# Patient Record
Sex: MALE | Race: WHITE | NOT HISPANIC OR LATINO | Employment: PART TIME | URBAN - NONMETROPOLITAN AREA
[De-identification: names, ages, dates, MRNs, and addresses within clinical notes are randomized per-mention and may not be internally consistent; named-entity substitution may affect disease eponyms.]

---

## 2017-01-04 PROBLEM — R79.89 LOW VITAMIN D LEVEL: Status: ACTIVE | Noted: 2017-01-04

## 2017-06-06 ENCOUNTER — TELEPHONE (OUTPATIENT)
Dept: INTERNAL MEDICINE | Facility: CLINIC | Age: 72
End: 2017-06-06

## 2017-06-06 NOTE — TELEPHONE ENCOUNTER
Patients wife called the office requesting to see if you could give her a call back when you get a chance in regards to [facility] exams. She is wanting to know when Dr Zhou thinks he is due for his next exam.

## 2017-08-02 ENCOUNTER — OFFICE VISIT (OUTPATIENT)
Dept: INTERNAL MEDICINE | Facility: CLINIC | Age: 72
End: 2017-08-02

## 2017-08-02 VITALS
SYSTOLIC BLOOD PRESSURE: 140 MMHG | OXYGEN SATURATION: 98 % | WEIGHT: 175 LBS | HEART RATE: 78 BPM | DIASTOLIC BLOOD PRESSURE: 80 MMHG | RESPIRATION RATE: 14 BRPM | HEIGHT: 70 IN | BODY MASS INDEX: 25.05 KG/M2 | TEMPERATURE: 98.1 F

## 2017-08-02 DIAGNOSIS — I10 ESSENTIAL HYPERTENSION: ICD-10-CM

## 2017-08-02 DIAGNOSIS — R35.1 NOCTURIA: ICD-10-CM

## 2017-08-02 DIAGNOSIS — E78.5 HYPERLIPIDEMIA, UNSPECIFIED HYPERLIPIDEMIA TYPE: Primary | ICD-10-CM

## 2017-08-02 DIAGNOSIS — R79.89 LOW VITAMIN D LEVEL: ICD-10-CM

## 2017-08-02 DIAGNOSIS — R73.9 HYPERGLYCEMIA: ICD-10-CM

## 2017-08-02 LAB
HBA1C MFR BLD: 5.7 %
PSA SERPL-MCNC: 2.85 NG/ML (ref 0.06–4)

## 2017-08-02 PROCEDURE — 96160 PT-FOCUSED HLTH RISK ASSMT: CPT | Performed by: INTERNAL MEDICINE

## 2017-08-02 PROCEDURE — 93000 ELECTROCARDIOGRAM COMPLETE: CPT | Performed by: INTERNAL MEDICINE

## 2017-08-02 PROCEDURE — G0439 PPPS, SUBSEQ VISIT: HCPCS | Performed by: INTERNAL MEDICINE

## 2017-08-02 NOTE — PROGRESS NOTES
QUICK REFERENCE INFORMATION:  The ABCs of the Annual Wellness Visit    Subsequent Medicare Wellness Visit    HEALTH RISK ASSESSMENT    1945    Recent Hospitalizations:  No hospitalization(s) within the last year..        Current Medical Providers:  Patient Care Team:  Getachew Zhou MD as PCP - General        Smoking Status:  History   Smoking Status   • Never Smoker   Smokeless Tobacco   • Never Used       Alcohol Consumption:  History   Alcohol Use No       Depression Screen:   PHQ-9 Depression Screening 8/2/2017   Little interest or pleasure in doing things 0   Feeling down, depressed, or hopeless 0   PHQ-9 Total Score 0       Health Habits and Functional and Cognitive Screening:  Functional & Cognitive Status 8/2/2017   Do you have difficulty preparing food and eating? No   Do you have difficulty bathing yourself? No   Do you have difficulty getting dressed? No   Do you have difficulty using the toilet? No   Do you have difficulty moving around from place to place? No   In the past year have you fallen or experienced a near fall? No   Do you need help using the phone?  No   Are you deaf or do you have serious difficulty hearing?  No   Do you need help with transportation? No   Do you need help shopping? No   Do you need help preparing meals?  No   Do you need help with housework?  No   Do you need help with laundry? No   Do you need help taking your medications? No   Do you need help managing money? No   Do you have difficulty concentrating, remembering or making decisions? No       Health Habits  Current Diet: Well Balanced Diet  Dental Exam: Up to date  Eye Exam: Up to date  Exercise (times per week): 5 times per week  Current Exercise Activities Include: Walking      Does the patient have evidence of cognitive impairment? No    Aspirin use counseling: Taking ASA appropriately as indicated      Recent Lab Results:  CMP:  Lab Results   Component Value Date    BUN 20 03/04/2016    CREATININE 0.8 03/04/2016      03/04/2016    K 5.0 03/04/2016    CO2 32 (H) 03/04/2016    CALCIUM 8.2 (L) 03/04/2016    ALBUMIN 3.6 03/04/2016    BILITOT 0.7 03/04/2016    ALKPHOS 73 03/04/2016    AST 35 (H) 03/04/2016    ALT 27 03/04/2016     Lipid Panel:  Lab Results   Component Value Date    TRIG 81 03/04/2016    HDL 54 03/04/2016    LDLDIRECT 141 (H) 03/04/2016     HbA1c:       Visual Acuity:  No exam data present    Age-appropriate Screening Schedule:  Refer to the list below for future screening recommendations based on patient's age, sex and/or medical conditions. Orders for these recommended tests are listed in the plan section. The patient has been provided with a written plan.    Health Maintenance   Topic Date Due   • TDAP/TD VACCINES (1 - Tdap) 04/05/1964   • PNEUMOCOCCAL VACCINES (65+ LOW/MEDIUM RISK) (1 of 2 - PCV13) 04/05/2010   • COLONOSCOPY  12/06/2016   • ZOSTER VACCINE  12/06/2016   • LIPID PANEL  03/04/2017   • INFLUENZA VACCINE  09/01/2017        Subjective   History of Present Illness    Jonathan Esqueda Jr. is a 72 y.o. male who presents for an Subsequent Wellness Visit.    The following portions of the patient's history were reviewed and updated as appropriate: allergies, current medications, past family history, past medical history, past social history, past surgical history and problem list.    Outpatient Medications Prior to Visit   Medication Sig Dispense Refill   • aspirin 81 MG tablet Take  by mouth.     • Cholecalciferol (VITAMIN D) 1000 UNITS tablet Take  by mouth.     • Multiple Vitamin (MULTI VITAMIN MENS) tablet Take  by mouth.     • benzonatate (TESSALON) 200 MG capsule Take 1 capsule by mouth 3 (Three) Times a Day As Needed (cough). 30 capsule 0   • levoFLOXacin (LEVAQUIN) 750 MG tablet Take 1 tablet by mouth Daily. 5 tablet 0     No facility-administered medications prior to visit.        Patient Active Problem List   Diagnosis   • Hyperlipidemia   • Bronchitis   • BMI 25.0-25.9,adult   • Low  "vitamin D        Advance Care Planning:  has NO advance directive - information provided to the patient today    Identification of Risk Factors:  Risk factors include: none.    Review of Systems   Constitutional: Negative.    HENT: Negative.    Eyes: Negative.    Respiratory: Negative.    Cardiovascular: Negative.    Gastrointestinal: Negative.    Endocrine: Negative.    Genitourinary: Negative.    Musculoskeletal: Negative.    Skin: Negative.    Allergic/Immunologic: Negative.    Neurological: Negative.    Hematological: Negative.    Psychiatric/Behavioral: Negative.    All other systems reviewed and are negative.      Compared to one year ago, the patient feels his physical health is the same.  Compared to one year ago, the patient feels his mental health is the same.    Objective     Physical Exam   Constitutional: He is oriented to person, place, and time. He appears well-developed and well-nourished.   HENT:   Head: Normocephalic and atraumatic.   Right Ear: External ear normal.   Left Ear: External ear normal.   Nose: Nose normal.   Mouth/Throat: Oropharynx is clear and moist.   Eyes: Conjunctivae and EOM are normal. Pupils are equal, round, and reactive to light.   Neck: Normal range of motion. Neck supple. No thyromegaly present.   Cardiovascular: Normal rate, regular rhythm, normal heart sounds and intact distal pulses.    Pulmonary/Chest: Effort normal and breath sounds normal.   Abdominal: Soft. Bowel sounds are normal.   Musculoskeletal: Normal range of motion.   Neurological: He is alert and oriented to person, place, and time. He has normal reflexes.   Skin: Skin is warm and dry.   Psychiatric: He has a normal mood and affect. His behavior is normal. Judgment and thought content normal.   Nursing note and vitals reviewed.      Vitals:    08/02/17 0816   BP: 140/80   Pulse: 78   Resp: 14   Temp: 98.1 °F (36.7 °C)   SpO2: 98%   Weight: 175 lb (79.4 kg)   Height: 70\" (177.8 cm)       Body mass index is " 25.11 kg/(m^2).  Discussed the patient's BMI with him. The BMI is in the acceptable range.    Assessment/Plan   Patient Self-Management and Personalized Health Advice  The patient has been provided with information about: diet, exercise and weight management and preventive services including:   · Advance directive.    Visit Diagnoses:  No diagnosis found.    No orders of the defined types were placed in this encounter.      Outpatient Encounter Prescriptions as of 8/2/2017   Medication Sig Dispense Refill   • aspirin 81 MG tablet Take  by mouth.     • Cholecalciferol (VITAMIN D) 1000 UNITS tablet Take  by mouth.     • Multiple Vitamin (MULTI VITAMIN MENS) tablet Take  by mouth.     • [DISCONTINUED] benzonatate (TESSALON) 200 MG capsule Take 1 capsule by mouth 3 (Three) Times a Day As Needed (cough). 30 capsule 0   • [DISCONTINUED] levoFLOXacin (LEVAQUIN) 750 MG tablet Take 1 tablet by mouth Daily. 5 tablet 0     No facility-administered encounter medications on file as of 8/2/2017.        Reviewed use of high risk medication in the elderly: no  Reviewed for potential of harmful drug interactions in the elderly: no    Follow Up:  No Follow-up on file.     An After Visit Summary and PPPS with all of these plans were given to the patient.         Colon:Pandiverticulosis.  Transverse colon lesion.  Status post endoscopic mucosal resection and thermal ablation therapy.   Multiple colon polyps.   Internal hemorrhoids.repeat   umbilical hernia watch   SK right ear and left upper back watch   HLD diet  AST high hx, repeat lab  hyperglycemia diet and repeat.  Nocturia occa, do PSA  vitD low continue vitD3 1000u daily  zostavax. prevnar and pneumovax Td and flu vaccines all refused by patient  Borderline BP do EKg  annual PE      ECG 12 Lead  Date/Time: 8/2/2017 9:41 AM  Performed by: MARCEL BATES  Authorized by: MARCEL BATES   Comparison: not compared with previous ECG   Rhythm: sinus rhythm  Rate: normal  Conduction:  conduction normal  ST Segments: ST segments normal  QRS axis: normal  Other: no other findings  Clinical impression: normal ECG

## 2017-08-03 LAB
25(OH)D3+25(OH)D2 SERPL-MCNC: 20.8 NG/ML
ALBUMIN SERPL-MCNC: 3.5 G/DL (ref 3.5–5)
ALBUMIN/GLOB SERPL: 1.2 G/DL (ref 1–2)
ALP SERPL-CCNC: 64 U/L (ref 38–126)
ALT SERPL-CCNC: 35 U/L (ref 13–69)
APPEARANCE UR: CLEAR
AST SERPL-CCNC: 31 U/L (ref 15–46)
BASOPHILS # BLD AUTO: 0.04 10*3/MM3 (ref 0–0.2)
BASOPHILS NFR BLD AUTO: 0.8 % (ref 0–2.5)
BILIRUB SERPL-MCNC: 0.6 MG/DL (ref 0.2–1.3)
BILIRUB UR QL STRIP: NEGATIVE
BUN SERPL-MCNC: 20 MG/DL (ref 7–20)
BUN/CREAT SERPL: 20 (ref 6.3–21.9)
CALCIUM SERPL-MCNC: 8.2 MG/DL (ref 8.4–10.2)
CHLORIDE SERPL-SCNC: 107 MMOL/L (ref 98–107)
CHOLEST SERPL-MCNC: 198 MG/DL (ref 0–199)
CO2 SERPL-SCNC: 24 MMOL/L (ref 26–30)
COLOR UR: YELLOW
CREAT SERPL-MCNC: 1 MG/DL (ref 0.6–1.3)
EOSINOPHIL # BLD AUTO: 0.15 10*3/MM3 (ref 0–0.7)
EOSINOPHIL NFR BLD AUTO: 3 % (ref 0–7)
ERYTHROCYTE [DISTWIDTH] IN BLOOD BY AUTOMATED COUNT: 13.5 % (ref 11.5–14.5)
GLOBULIN SER CALC-MCNC: 3 GM/DL
GLUCOSE SERPL-MCNC: 101 MG/DL (ref 74–98)
GLUCOSE UR QL: NEGATIVE
HCT VFR BLD AUTO: 45.2 % (ref 42–52)
HCV AB S/CO SERPL IA: <0.1 S/CO RATIO (ref 0–0.9)
HDLC SERPL-MCNC: 56 MG/DL (ref 40–60)
HGB BLD-MCNC: 14.7 G/DL (ref 14–18)
HGB UR QL STRIP: NEGATIVE
IMM GRANULOCYTES # BLD: 0.01 10*3/MM3 (ref 0–0.06)
IMM GRANULOCYTES NFR BLD: 0.2 % (ref 0–0.6)
KETONES UR QL STRIP: NEGATIVE
LDLC SERPL CALC-MCNC: 127 MG/DL (ref 0–99)
LEUKOCYTE ESTERASE UR QL STRIP: NEGATIVE
LYMPHOCYTES # BLD AUTO: 1.48 10*3/MM3 (ref 0.6–3.4)
LYMPHOCYTES NFR BLD AUTO: 29.7 % (ref 10–50)
MCH RBC QN AUTO: 32.7 PG (ref 27–31)
MCHC RBC AUTO-ENTMCNC: 32.5 G/DL (ref 30–37)
MCV RBC AUTO: 100.4 FL (ref 80–94)
MONOCYTES # BLD AUTO: 0.41 10*3/MM3 (ref 0–0.9)
MONOCYTES NFR BLD AUTO: 8.2 % (ref 0–12)
NEUTROPHILS # BLD AUTO: 2.89 10*3/MM3 (ref 2–6.9)
NEUTROPHILS NFR BLD AUTO: 58.1 % (ref 37–80)
NITRITE UR QL STRIP: NEGATIVE
NRBC BLD AUTO-RTO: 0 /100 WBC (ref 0–0)
PH UR STRIP: 6 [PH] (ref 5–8)
PLATELET # BLD AUTO: 184 10*3/MM3 (ref 130–400)
POTASSIUM SERPL-SCNC: 4.3 MMOL/L (ref 3.5–5.1)
PROT SERPL-MCNC: 6.5 G/DL (ref 6.3–8.2)
PROT UR QL STRIP: NEGATIVE
RBC # BLD AUTO: 4.5 10*6/MM3 (ref 4.7–6.1)
SODIUM SERPL-SCNC: 142 MMOL/L (ref 137–145)
SP GR UR: 1.02 (ref 1–1.03)
TRIGL SERPL-MCNC: 74 MG/DL
TSH SERPL DL<=0.005 MIU/L-ACNC: 3.47 MIU/ML (ref 0.47–4.68)
UROBILINOGEN UR STRIP-MCNC: NORMAL MG/DL
VLDLC SERPL CALC-MCNC: 14.8 MG/DL
WBC # BLD AUTO: 4.98 10*3/MM3 (ref 4.8–10.8)

## 2017-08-10 ENCOUNTER — OFFICE VISIT (OUTPATIENT)
Dept: INTERNAL MEDICINE | Facility: CLINIC | Age: 72
End: 2017-08-10

## 2017-08-10 VITALS
BODY MASS INDEX: 25.2 KG/M2 | DIASTOLIC BLOOD PRESSURE: 72 MMHG | HEIGHT: 70 IN | HEART RATE: 65 BPM | OXYGEN SATURATION: 98 % | WEIGHT: 176 LBS | SYSTOLIC BLOOD PRESSURE: 120 MMHG | TEMPERATURE: 97.5 F

## 2017-08-10 DIAGNOSIS — E78.5 HYPERLIPIDEMIA, UNSPECIFIED HYPERLIPIDEMIA TYPE: ICD-10-CM

## 2017-08-10 DIAGNOSIS — R71.8 ELEVATED MCV: ICD-10-CM

## 2017-08-10 DIAGNOSIS — E58 CALCIUM DEFICIENCY: Primary | ICD-10-CM

## 2017-08-10 DIAGNOSIS — R73.9 HYPERGLYCEMIA: ICD-10-CM

## 2017-08-10 DIAGNOSIS — R79.89 LOW VITAMIN D LEVEL: ICD-10-CM

## 2017-08-10 PROCEDURE — 99214 OFFICE O/P EST MOD 30 MIN: CPT | Performed by: INTERNAL MEDICINE

## 2017-08-10 NOTE — PROGRESS NOTES
Subjective   Jonathan Esqueda Jr. is a 72 y.o. male.     Chief Complaint   Patient presents with   • Follow-up     discuss lab results       History of Present Illness   Patient here for follow-up.  Colonoscopy pending.  Hyperlipidemia on diet.  Liver enzymes returned to normal.  Hyperglycemia stable on diet.  Vitamin D low on blood tests.  Calcium is and MCV elevated and the vitamin D deficiency.    Current Outpatient Prescriptions:   •  Multiple Vitamin (MULTI VITAMIN MENS) tablet, Take  by mouth., Disp: , Rfl:   •  aspirin 81 MG tablet, Take  by mouth., Disp: , Rfl:   •  Cholecalciferol (VITAMIN D) 1000 UNITS tablet, Take  by mouth., Disp: , Rfl:     The following portions of the patient's history were reviewed and updated as appropriate: allergies, current medications, past family history, past medical history, past social history, past surgical history and problem list.    Review of Systems   Constitutional: Negative.    Respiratory: Negative.    Cardiovascular: Negative.    Gastrointestinal: Negative.    Musculoskeletal: Negative.    Skin: Negative.    Neurological: Negative.    Psychiatric/Behavioral: Negative.        Objective   Physical Exam   Constitutional: He is oriented to person, place, and time. He appears well-nourished.   Neck: Neck supple.   Cardiovascular: Normal rate, regular rhythm and normal heart sounds.    Pulmonary/Chest: Effort normal and breath sounds normal.   Abdominal: Bowel sounds are normal.   Neurological: He is alert and oriented to person, place, and time.   Skin: Skin is warm.   Psychiatric: He has a normal mood and affect.       All tests have been reviewed.    Assessment/Plan   There are no diagnoses linked to this encounter.          Colon:Pandiverticulosis.  Transverse colon lesion.  Status post endoscopic mucosal resection and thermal ablation therapy.   Multiple colon polyps.   Internal hemorrhoids.repeat   umbilical hernia watch   SK right ear and left upper back watch    HLD diet  hyperglycemia diet  Nocturia occa, PSA nl  vitD low continue vitD3 1000u daily  zostavax. prevnar and pneumovax Td and flu vaccines all refused by patient  Calcium low, do Ion Ca++  MCV high , do lab      6 mo

## 2017-08-11 LAB
CA-I SERPL ISE-MCNC: 5 MG/DL (ref 4.5–5.6)
FOLATE SERPL-MCNC: 16.9 NG/ML
VIT B12 SERPL-MCNC: 487 PG/ML (ref 239–931)

## 2017-09-19 ENCOUNTER — PREP FOR SURGERY (OUTPATIENT)
Dept: OTHER | Facility: HOSPITAL | Age: 72
End: 2017-09-19

## 2017-09-19 ENCOUNTER — OFFICE VISIT (OUTPATIENT)
Dept: GASTROENTEROLOGY | Facility: CLINIC | Age: 72
End: 2017-09-19

## 2017-09-19 VITALS
HEIGHT: 70 IN | HEART RATE: 80 BPM | BODY MASS INDEX: 25.2 KG/M2 | TEMPERATURE: 97.9 F | RESPIRATION RATE: 16 BRPM | WEIGHT: 176 LBS | DIASTOLIC BLOOD PRESSURE: 89 MMHG | SYSTOLIC BLOOD PRESSURE: 156 MMHG

## 2017-09-19 DIAGNOSIS — R19.7 DIARRHEA, UNSPECIFIED TYPE: ICD-10-CM

## 2017-09-19 DIAGNOSIS — K52.9 ILEITIS: ICD-10-CM

## 2017-09-19 DIAGNOSIS — K63.5 COLON POLYPS: Primary | ICD-10-CM

## 2017-09-19 DIAGNOSIS — K63.9 LESION OF COLON: ICD-10-CM

## 2017-09-19 DIAGNOSIS — Z86.010 HISTORY OF COLON POLYPS: Primary | ICD-10-CM

## 2017-09-19 PROCEDURE — 99214 OFFICE O/P EST MOD 30 MIN: CPT | Performed by: INTERNAL MEDICINE

## 2017-09-19 NOTE — PATIENT INSTRUCTIONS
1. Colonoscopy: Description of the procedure, risks benefits alternatives and options including non-operative options were discussed with the patient in detail.  The patient understands and wishes to proceed.  Of interest that the patient had resection of a large adenomatous lesion with some atypia.  Furthermore,  the patient had some residual polyps which needs to be resected. The patient was noted to have erosions and ulcerations within the terminal ileum in May 2016.  2. Avoid NSAIDs.

## 2017-09-19 NOTE — PROGRESS NOTES
Chief Complaint   Patient presents with   • Follow-up       History of Present Illness     The patient came back for follow visit today.  The patient feels ok.  The patient denies recent change in bowel habits.  There is no constipation. The patient has a tendency to have loose stools off and on for the last 2-3 years.  on average the patient has 2 loose stools a day.  there is no associated tenesmus.  There is no nocturnal element of diarrhea.   There is no history of abdominal pain.  There is no history of overt GI bleed (hematemesis melena or hematochezia).  The patient denies nausea or vomiting.  There is no history of reflux.  The patient denies dysphagia or odynophagia.  There is no history of recent significant weight loss.  There is no history of liver or pancreatic disease.     Review of Systems   Constitutional: Negative for appetite change, chills, fatigue, fever and unexpected weight change.   HENT: Negative for mouth sores, nosebleeds and trouble swallowing.    Eyes: Negative for discharge and redness.   Respiratory: Positive for apnea. Negative for cough and shortness of breath.    Cardiovascular: Negative for chest pain, palpitations and leg swelling.   Gastrointestinal: Negative for abdominal distention, abdominal pain, anal bleeding, blood in stool, constipation, diarrhea, nausea and vomiting.   Endocrine: Negative for cold intolerance, heat intolerance and polydipsia.   Genitourinary: Negative for dysuria, hematuria and urgency.   Musculoskeletal: Negative for arthralgias, joint swelling and myalgias.   Skin: Negative for rash.   Allergic/Immunologic: Negative for food allergies and immunocompromised state.   Neurological: Negative for dizziness, seizures, syncope and headaches.   Hematological: Negative for adenopathy. Does not bruise/bleed easily.   Psychiatric/Behavioral: Negative for dysphoric mood. The patient is not nervous/anxious and is not hyperactive.      Patient Active Problem List  "  Diagnosis   • Hyperlipidemia   • Low vitamin D    • Hyperglycemia   • Calcium deficiency   • Elevated MCV     Past Medical History:   Diagnosis Date   • Colon polyp    • Hyperglycemia    • Renal calculi 2006   • Seborrheic keratosis    • Umbilical hernia    • Vitamin D deficiency 2016     Past Surgical History:   Procedure Laterality Date   • COLONOSCOPY  2016   • OTHER SURGICAL HISTORY  1995    Jaw surgery     Family History   Problem Relation Age of Onset   • Heart failure Father    • No Known Problems Mother    • No Known Problems Sister    • Cirrhosis Brother    • Alcohol abuse Brother    • No Known Problems Maternal Aunt    • No Known Problems Maternal Uncle    • No Known Problems Paternal Aunt    • No Known Problems Paternal Uncle    • No Known Problems Maternal Grandmother    • No Known Problems Maternal Grandfather    • No Known Problems Paternal Grandmother    • No Known Problems Paternal Grandfather    • Celiac disease Neg Hx    • Colon polyps Neg Hx    • Crohn's disease Neg Hx    • Cystic fibrosis Neg Hx    • Esophageal cancer Neg Hx    • Hemochromatosis Neg Hx    • Inflammatory bowel disease Neg Hx    • Irritable bowel syndrome Neg Hx    • Liver cancer Neg Hx    • Liver disease Neg Hx    • Rectal cancer Neg Hx    • Stomach cancer Neg Hx    • Ulcerative colitis Neg Hx    • Clayton's disease Neg Hx    • Pancreatitis Neg Hx      Social History   Substance Use Topics   • Smoking status: Never Smoker   • Smokeless tobacco: Never Used   • Alcohol use No       Current Outpatient Prescriptions:   •  Cholecalciferol (VITAMIN D) 1000 UNITS tablet, Take  by mouth., Disp: , Rfl:   •  Multiple Vitamin (MULTI VITAMIN MENS) tablet, Take  by mouth., Disp: , Rfl:     No Known Allergies    Blood pressure 156/89, pulse 80, temperature 97.9 °F (36.6 °C), resp. rate 16, height 70\" (177.8 cm), weight 176 lb (79.8 kg).    Physical Exam   Constitutional: He is oriented to person, place, and time. He appears well-developed and " well-nourished. No distress.   HENT:   Head: Normocephalic and atraumatic.   Right Ear: Hearing and external ear normal.   Left Ear: Hearing and external ear normal.   Nose: Nose normal.   Mouth/Throat: Oropharynx is clear and moist and mucous membranes are normal. Mucous membranes are not pale, not dry and not cyanotic. No oral lesions. No oropharyngeal exudate.   Eyes: Conjunctivae and EOM are normal. Right eye exhibits no discharge. Left eye exhibits no discharge. No scleral icterus.   Neck: Trachea normal. Neck supple. No JVD present. No edema present. No thyroid mass and no thyromegaly present.   Cardiovascular: Normal rate, regular rhythm, S2 normal and normal heart sounds.  Exam reveals no gallop, no S3 and no friction rub.    No murmur heard.  Pulmonary/Chest: Effort normal and breath sounds normal. No respiratory distress. He has no wheezes. He has no rales. He exhibits no tenderness.   Abdominal: Soft. Normal appearance and bowel sounds are normal. He exhibits no distension, no ascites and no mass. There is no splenomegaly or hepatomegaly. There is no tenderness. There is no rigidity, no rebound and no guarding. No hernia.   Musculoskeletal: He exhibits no tenderness or deformity.       Vascular Status -  His exam exhibits no right foot edema. His exam exhibits no left foot edema.  Lymphadenopathy:     He has no cervical adenopathy.        Left: No supraclavicular adenopathy present.   Neurological: He is alert and oriented to person, place, and time. He has normal strength. No cranial nerve deficit or sensory deficit. He exhibits normal muscle tone. Coordination normal.   Skin: No rash noted. He is not diaphoretic. No cyanosis. No pallor. Nails show no clubbing.   Psychiatric: He has a normal mood and affect. His behavior is normal. Judgment and thought content normal.   Nursing note and vitals reviewed.    Stigmata of chronic liver disease:  None.  Asterixis:  None.    Laboratory Testing:  Upon review of  medical records:    Dated August 2, 2017 sodium 142 potassium 4.3 chloride 107 CO2 24 BUN 20 serum creatinine 1.00 glucose 101.  Calcium 8.2.  Albumin 3.50.  T bili 0.6 AST 31 ALT 35 alkaline phosphatase 64.  Total cholesterol 198.  Triglycerides 74.  TSH 3.470.  PSA 2.850.  Hepatitis C virus antibody negative.  WBC 4.98 hemoglobin 14.7 hematocrit 45.2 .4 platelet count 184.    Dated August 10, 2017 vitamin B12 level 487.  Folate level 16.90.     Procedures:   Upon review of medical records:      The patient had undergone a colonoscopy to terminal ileum on May 18, 2016 which upon review revealed: Multiple colon polyps, distal transverse colon flat lesion, granularity within the terminal ileum, and internal hemorrhoids.  The patient had multiple polypectomies, one endoscopic mucosal resection of the distal transverse colon lesion, thermal ablation of the colonic lesion using argon plasma coagulator.  Biopsies from the terminal ileum revealed nonspecific active ileitis.  Biopsies from the colon polyps revealed tubular adenomata.  Biopsies from the distal transverse colon lesion revealed fragments of tubular adenomata.  Although focally increased cytology atypia was noted no definite high-grade dysplasia was seen.     Assessment and Plan:      Jonathan was seen today for follow-up.    Diagnoses and all orders for this visit:    Colon polyps  Comments:  Multiple polyps.    Lesion of colon  Comments:  Large colonic lesion which has been resected.  Adenomatous with some atypia.        Ileitis  Comments:  Among differential includes underlying Crohn's disease.  Of interest the patient was taking NSAIDs at that time which may cause erosions within the TI.    Diarrhea, unspecified type  Comments:  Mild. 2 lose stools a day on average.              Plan  and Patient Instructions:    Patient Instructions     1. Colonoscopy: Description of the procedure, risks benefits alternatives and options including non-operative  options were discussed with the patient in detail.  The patient understands and wishes to proceed.  Of interest that the patient had resection of a large adenomatous lesion with some atypia.  Furthermore,  the patient had some residual polyps which needs to be resected. The patient was noted to have erosions and ulcerations within the terminal ileum in May 2016.  2. Avoid NSAIDs.        Dayne Rodríguez MD

## 2017-09-21 RX ORDER — SODIUM CHLORIDE 9 MG/ML
70 INJECTION, SOLUTION INTRAVENOUS CONTINUOUS PRN
Status: CANCELLED | OUTPATIENT
Start: 2017-09-21

## 2017-09-22 PROBLEM — R19.7 DIARRHEA: Status: ACTIVE | Noted: 2017-09-22

## 2017-09-22 PROBLEM — K52.9 ILEITIS: Status: ACTIVE | Noted: 2017-09-22

## 2017-09-22 PROBLEM — Z86.010 HISTORY OF COLON POLYPS: Status: ACTIVE | Noted: 2017-09-22

## 2017-09-22 PROBLEM — K63.9 LESION OF COLON: Status: ACTIVE | Noted: 2017-09-22

## 2017-11-15 ENCOUNTER — ANESTHESIA EVENT (OUTPATIENT)
Dept: GASTROENTEROLOGY | Facility: HOSPITAL | Age: 72
End: 2017-11-15

## 2017-11-15 ENCOUNTER — ANESTHESIA (OUTPATIENT)
Dept: GASTROENTEROLOGY | Facility: HOSPITAL | Age: 72
End: 2017-11-15

## 2017-11-15 ENCOUNTER — HOSPITAL ENCOUNTER (OUTPATIENT)
Facility: HOSPITAL | Age: 72
Setting detail: HOSPITAL OUTPATIENT SURGERY
Discharge: HOME | End: 2017-11-15
Attending: INTERNAL MEDICINE | Admitting: INTERNAL MEDICINE

## 2017-11-15 VITALS
HEIGHT: 71 IN | TEMPERATURE: 96.8 F | BODY MASS INDEX: 24.5 KG/M2 | WEIGHT: 175 LBS | HEART RATE: 65 BPM | SYSTOLIC BLOOD PRESSURE: 155 MMHG | OXYGEN SATURATION: 98 % | RESPIRATION RATE: 18 BRPM | DIASTOLIC BLOOD PRESSURE: 84 MMHG

## 2017-11-15 DIAGNOSIS — Z86.010 HISTORY OF COLON POLYPS: ICD-10-CM

## 2017-11-15 DIAGNOSIS — K63.9 LESION OF COLON: ICD-10-CM

## 2017-11-15 DIAGNOSIS — K52.9 ILEITIS: ICD-10-CM

## 2017-11-15 DIAGNOSIS — R19.7 DIARRHEA, UNSPECIFIED TYPE: ICD-10-CM

## 2017-11-15 PROCEDURE — 45380 COLONOSCOPY AND BIOPSY: CPT | Performed by: INTERNAL MEDICINE

## 2017-11-15 PROCEDURE — 88305 TISSUE EXAM BY PATHOLOGIST: CPT | Performed by: INTERNAL MEDICINE

## 2017-11-15 PROCEDURE — S0260 H&P FOR SURGERY: HCPCS | Performed by: INTERNAL MEDICINE

## 2017-11-15 PROCEDURE — 25010000002 PROPOFOL 200 MG/20ML EMULSION: Performed by: NURSE ANESTHETIST, CERTIFIED REGISTERED

## 2017-11-15 PROCEDURE — 45385 COLONOSCOPY W/LESION REMOVAL: CPT | Performed by: INTERNAL MEDICINE

## 2017-11-15 RX ORDER — SODIUM CHLORIDE 9 MG/ML
70 INJECTION, SOLUTION INTRAVENOUS CONTINUOUS PRN
Status: DISCONTINUED | OUTPATIENT
Start: 2017-11-15 | End: 2017-11-15 | Stop reason: HOSPADM

## 2017-11-15 RX ORDER — PROPOFOL 10 MG/ML
INJECTION, EMULSION INTRAVENOUS AS NEEDED
Status: DISCONTINUED | OUTPATIENT
Start: 2017-11-15 | End: 2017-11-15 | Stop reason: SURG

## 2017-11-15 RX ADMIN — PROPOFOL 50 MG: 10 INJECTION, EMULSION INTRAVENOUS at 10:51

## 2017-11-15 RX ADMIN — SODIUM CHLORIDE 70 ML/HR: 9 INJECTION, SOLUTION INTRAVENOUS at 08:51

## 2017-11-15 RX ADMIN — PROPOFOL 100 MG: 10 INJECTION, EMULSION INTRAVENOUS at 10:55

## 2017-11-15 RX ADMIN — PROPOFOL 100 MG: 10 INJECTION, EMULSION INTRAVENOUS at 11:02

## 2017-11-15 RX ADMIN — PROPOFOL 100 MG: 10 INJECTION, EMULSION INTRAVENOUS at 11:08

## 2017-11-15 RX ADMIN — PROPOFOL 50 MG: 10 INJECTION, EMULSION INTRAVENOUS at 11:20

## 2017-11-15 RX ADMIN — PROPOFOL 50 MG: 10 INJECTION, EMULSION INTRAVENOUS at 11:31

## 2017-11-15 RX ADMIN — PROPOFOL 50 MG: 10 INJECTION, EMULSION INTRAVENOUS at 10:53

## 2017-11-15 RX ADMIN — Medication 20 MG: at 10:55

## 2017-11-15 NOTE — ANESTHESIA POSTPROCEDURE EVALUATION
Patient: Jonathan Esqueda Jr.    Procedure Summary     Date Anesthesia Start Anesthesia Stop Room / Location    11/15/17 1051 1146 [facility] ENDOSCOPY 2 [facility]       Procedure Diagnosis Surgeon Provider    COLONOSCOPY WITH HOT SNARE POLYPECTOMIES ; COLD SANRE POLYPECTOMIES , and cold biopsy polypectomies (N/A Anus) Diverticulosis of colon; Colon polyps; Ileitis; Internal hemorrhoids  (Ileitis [K52.9]; Lesion of colon [K63.9]; History of colon polyps [Z86.010]; Diarrhea, unspecified type [R19.7]) MD Jeff Gonzales CRNA          Anesthesia Type: MAC  Last vitals  BP   153/79 (11/15/17 0836)   Temp   97.3 °F (36.3 °C) (11/15/17 0836)   Pulse   61 (11/15/17 0836)   Resp   16 (11/15/17 0836)     SpO2   97 % (11/15/17 0836)     Post Anesthesia Care and Evaluation    Patient location during evaluation: PACU  Patient participation: complete - patient participated  Level of consciousness: awake and alert  Pain score: 0  Pain management: satisfactory to patient  Airway patency: patent  Anesthetic complications: No anesthetic complications  PONV Status: none  Cardiovascular status: stable and acceptable  Respiratory status: acceptable  Hydration status: acceptable

## 2017-11-15 NOTE — DISCHARGE INSTRUCTIONS
"Rest today  No pushing,pulling,tugging,heavy lifting, or strenuous activity   No major decision making,driving,or drinking alcoholic beverages for 24 hours due to the sedation you received  Always use good hand hygiene/washing technique  No driving on pain medication.    To assist you in voiding:  Drink plenty of fluids  Listen to running water while attempting to void.    If you are unable to urinate and you have an uncomfortable urge to void or it has been   6 hours since you were discharged, return to the Emergency Room.      Postprocedure instructions:    Nothing by mouth to fully alert.  Once fully alert may have clear liquid diet.  Advance diet as tolerated.  Vital signs as routine.    Diet:     Avoid Dairy Products.      Blood Thinner Directions:    Avoid Aspirin & other NSAIDS for _7__ days. Tylenol is okay.    Treatments:    May take \"Imodium - AD\" over-the-counter.Take 1/4 tablet at night orally. The dose may be increased to 1/2 tablet at night or even twice a day if needed.   Adjust the dose to have 1-2 soft stools a day.      Other Instructions:    Call Psychiatric at [phone] or come to the Emergency Department if you experience the following: Chest pain, abdominal pain, bleeding (vomiting of blood or coffee colored material, black stools or zee blood in stools), fever/chills, nausea and vomiting or dizziness.      Follow-up:  DR. SURYA HOUSE in 4 weeks.Office phone # [phone].    Follow-up colonoscopy: in 3 years.    "

## 2017-11-15 NOTE — H&P
Chief complaint:  Colon Cancer Screen, Diarrhea, H/O Colonic Lesion, H/O Ileitis    History of present illness:     There is no history of: Abdominal Pain, Nausea, Vomiting, Reflux, Dysphagia, BH Change, Constipation, Hematemesis, Melena, BRBPR, Pancreatic or Liver Disease.    Past medical history:   Past Medical History:   Diagnosis Date   • Colon polyp    • History of exercise stress test     [facility] 20 YEARS AGO, REPORTS ALL WNL'S   • Hyperglycemia    • Renal calculi 2006    REPORTS NO SURGICAL INTERVENTION REQUIRED   • Seborrheic keratosis    • Umbilical hernia    • Vitamin D deficiency 2016       Surgical history:    Past Surgical History:   Procedure Laterality Date   • ADENOIDECTOMY     • COLONOSCOPY  2016   • OTHER SURGICAL HISTORY  1995    Jaw surgery   • TONSILLECTOMY         Social history:   ETOH: No  Tobacco Use:  No  Other Notes:    Allergies:  Review of patient's allergies indicates no known allergies.    Latex allergy: None  Contrast allergy: None    Medications:  Prescriptions Prior to Admission   Medication Sig Dispense Refill Last Dose   • Multiple Vitamin (MULTI VITAMIN MENS) tablet Take 1 tablet by mouth Daily.   Past Week at Unknown time   • Cholecalciferol (VITAMIN D) 1000 UNITS tablet Take 1,000 Units by mouth Daily.   More than a month at Unknown time       Review of systems:   Constitutional: No recent:  Fever, Weight loss or Night sweats, no Glaucoma.  Respiratory: No recent: Hemoptysis, SOA, Cough, Sputum.    No Asthma or COPD.  Cardiovascular: No Recent: Chest Pains, Orthopnea, PND, Palpitations or MI.     No history of: HTN, CAD, MI, CHF, VHD, RHD, PVD, or Arrhythmia.  Endocrine: No history of: DM, Hypothyroidism or Hyperthyroidism.  Genitourinary: No history of: Renal Failure, Recent UTI; No BPH.  Musculoskeletal: No history of: OA, RA, Gout, SLE or Fibromyalgia.  Neurological: No history of: Dementia, Migraines, RLS, Recent Seizures, CVA, TIA.   Hem. Oncology: No history of:  "Anemia or Known Cancer.  Psychiatric: No history of: Depression or Anxiety.     VITAL SIGNS:    Blood pressure 153/79, pulse 61, temperature 97.3 °F (36.3 °C), temperature source Temporal Artery , resp. rate 16, height 71\" (180.3 cm), weight 175 lb (79.4 kg), SpO2 97 %.    PHYSICAL EXAMINATION:   HEENT: Normal.   Lungs: Clear to auscultation.  Heart: No S3, no murmur.    Abdomen: Soft.  BS+ ND, NT  Extremities: No edema.  No cyanosis.  Neuro: Alert X 3. No focal deficit.    Assessment:  Colon Cancer Screen, Diarrhea, H/O Colonic Lesion, H/O Ileitis    Plan:   Colonoscopy    Risks/Benefits:  The potential benefits, risk and/or side effects of the procedure and alternatives have been discussed with the patient/authorized representative and questions were answered.     "

## 2017-11-15 NOTE — PLAN OF CARE
Problem: GI Endoscopy (Adult)  Goal: Signs and Symptoms of Listed Potential Problems Will be Absent or Manageable (GI Endoscopy)  Outcome: Ongoing (interventions implemented as appropriate)    11/15/17 8810   GI Endoscopy   Problems Assessed (GI Endoscopy) all   Problems Present (GI Endoscopy) none

## 2017-11-15 NOTE — OP NOTE
PROCEDURE:  Colonoscopy to the terminal ileum with 2 hot snare, 3 cold snare, and 4 cold biopsy polypectomies as well as biopsies.     DATE OF PROCEDURE:  November 15, 2017    REFERRING PROVIDER:  Getachew Zhou MD     INSTRUMENT USED:  Olympus PCF H180 AL videocolonoscope.       INDICATIONS OF THE PROCEDURE:   This is a 72-year-old white male for colon cancer screening.  The patient has history of diarrhea, history of ileitis and multiple polyps as well as colonic lesion that had been resected in the past.  Currently undergoing colonoscopy for further evaluation.     BIOPSIES:  Biopsies were obtained from the terminal ileum.  Ascending colon: 1 hot snare and one cold snare polypectomies.  Sigmoid colon: 1 hot snare polypectomy.  Rectum: 2 cold snare polypectomies and 4 cold biopsy polypectomies.  Random biopsies were obtained from the colon including rectum.      PHOTOGRAPHS:  Photographs were included in the medical records.     MEDICATIONS:  MAC.       CONSENT/PREPROCEDURE EVALUATION:  Risks, benefits, alternatives and options of the procedure including risks of sedation/anesthesia were discussed with the patient and informed consent was obtained prior to the procedure.  History and physical examination were performed and nothing precluded the test.      REPORT:  The patient was placed in left lateral decubitus position and a digital examination was performed.  Once under the influence of IV sedation, the instrument was inserted into the rectum and advanced under direct vision to cecum which was identified by the ileocecal valve, triradiate folds and appendiceal orifice. The scope was then maneuvered into the terminal ileum.        FINDINGS:      Digital rectal examination:  Good anal tone.  No perianal pathology.  No mass.        Terminal ileum:  7-8 cm.  focal areas of erythema and erosions were seen.  Biopsies were obtained.     Cecum and ascending colon: 2, 4-5 millimeters sessile polyps were noted in the  ascending colon.  One was removed with cold and 1 with hot snare techniques.  Scant diverticulosis was noted.     Hepatic flexure, transverse colon, splenic flexure:  Diverticulosis was noted.  Areas of previous resection which had been tattooed in the past appeared to be within normal limits.        Descending colon, sigmoid colon and rectum: Diverticulosis was noted.  7, 2-6 mm sessile polyps were noted one in the sigmoid colon was removed with hot snare.  2 in the rectum were removed with cold snare and 4 in the rectum were removed with cold biopsy forceps.   No endoscopic evidence of colitis was seen.  Random biopsies were obtained from the colon upon withdrawal of the scope. A retroflex examination within the rectum revealed internal hemorrhoids.        The scope was then straightened, the lower GI tract was decompressed, and the scope was pulled out of the patient.  The patient tolerated the procedure well.  There were no immediate complications and the patient was transferred in stable condition for post procedure observation.      TECHNICAL DATA:   1. Erie prep score: 7 (2+2+3).    2. Anus to cecal time:  4  minutes.  3. Difficulty of examination:  Average.    4. Withdrawal time: 8 minutes.  5. Procedure time: 33 minutes  6. Retroflex examination in right colon: No.    7. Second look Rectum to cecum with decompression: Yes.    DIAGNOSES:    1. Pandiverticulosis more pronounced in the left colon within occasional diverticulum within the right colon.  2. Erosions and focal areas of erythema within the terminal ileum-terminal ileitis.  3. Colon polyps.  4. Areas of previous resection appeared stable.  5. Internal hemorrhoids.  6. No endoscopic evidence of colitis was seen.  Random biopsies were obtained from the colon upon withdrawal of the scope.      RECOMMENDATIONS:     Dietary instructions.  Follow biopsies.    Follow-up:   3-4 weeks.   Followup colonoscopy:  3 years.          Thank you very much for  letting me participate in the care of this patient. Please do not hesitate to call me if you have any questions.

## 2017-11-15 NOTE — ANESTHESIA PREPROCEDURE EVALUATION
Anesthesia Evaluation     Patient summary reviewed and Nursing notes reviewed   no history of anesthetic complications:  NPO Solid Status: > 8 hours  NPO Liquid Status: > 8 hours     Airway   Mallampati: I  TM distance: >3 FB  Neck ROM: full  no difficulty expected  Dental - normal exam     Pulmonary - negative pulmonary ROS and normal exam   Cardiovascular - normal exam    (+) hyperlipidemia      Neuro/Psych- negative ROS  GI/Hepatic/Renal/Endo - negative ROS     Musculoskeletal (-) negative ROS    Abdominal    Substance History - negative use     OB/GYN negative ob/gyn ROS         Other - negative ROS                                       Anesthesia Plan    ASA 2     MAC     intravenous induction   Anesthetic plan and risks discussed with patient.

## 2017-11-23 LAB
LAB AP CASE REPORT: NORMAL
Lab: NORMAL
PATH REPORT.FINAL DX SPEC: NORMAL

## 2017-12-12 ENCOUNTER — OFFICE VISIT (OUTPATIENT)
Dept: GASTROENTEROLOGY | Facility: CLINIC | Age: 72
End: 2017-12-12

## 2017-12-12 VITALS
DIASTOLIC BLOOD PRESSURE: 87 MMHG | RESPIRATION RATE: 18 BRPM | WEIGHT: 182 LBS | HEIGHT: 71 IN | BODY MASS INDEX: 25.48 KG/M2 | HEART RATE: 67 BPM | TEMPERATURE: 98.1 F | SYSTOLIC BLOOD PRESSURE: 168 MMHG

## 2017-12-12 DIAGNOSIS — K52.9 ILEITIS: Primary | ICD-10-CM

## 2017-12-12 DIAGNOSIS — D12.2 ADENOMATOUS POLYP OF ASCENDING COLON: ICD-10-CM

## 2017-12-12 DIAGNOSIS — K57.30 DIVERTICULOSIS OF COLON WITHOUT HEMORRHAGE: ICD-10-CM

## 2017-12-12 PROCEDURE — 99214 OFFICE O/P EST MOD 30 MIN: CPT | Performed by: INTERNAL MEDICINE

## 2017-12-12 NOTE — PATIENT INSTRUCTIONS
1. Low-fat-moderate fiber diet.  2. Check vitamin B12 and MMA levels.  Of interest the patient has terminal ileitis.  Retrospectively the patient was noted to have high MCV in the past.  The patient may call back regarding the results.  3. The patient should watch for diarrhea, right lower quadrant abdominal pain, or bleeding.  If so to seek medical attention.  4. A follow-up colonoscopy may be considered in 2 years or so to reevaluate the areas inncluding terminal ileum, sooner if the patient is symptomatic in terms of diarrhea, abdominal pain or bleeding.  5. Discussed with the patient in detail.  Opportunity was given to ask questions.

## 2017-12-12 NOTE — PROGRESS NOTES
Chief Complaint   Patient presents with   • Follow-up     Colon polyps in ileitis    History of Present Illness     The patient came back for follow visit today.  The patient feels ok.  The patient denies recent change in bowel habits.  There is no constipation.  Although, the patient denies diarrhea as such however there is increased frequency of stools for the last couple of years.  The patient generally has about 2-3 soft bowel movements a day.  There is no watery diarrhea.  There is no history of tenesmus.  There is no history of abdominal pain.  There is no history of overt GI bleed (hematemesis melena or hematochezia).  The patient denies nausea or vomiting.  There is no history of reflux.  The patient denies dysphagia or odynophagia.  There is no history of recent significant weight loss.  There is no history of liver or pancreatic disease.      The patient has history of multiple colon polyps and flat lesions that have been resected in May 2016.  Biopsies of which revealed tubular adenomata.  Recently he had undergone a colonoscopy to terminal ileum with removal of polyps (tubular adenoma).  Additionally was found to have erosions and erythema within the terminal ileum.  Biopsies were consistent with terminal ileitis.  Of interest the patient was noted to have elevated MCV in the past.  His B12 and folate level at that time were normal.     Review of Systems   Constitutional: Negative for appetite change, chills, fatigue, fever and unexpected weight change.   HENT: Negative for mouth sores, nosebleeds and trouble swallowing.    Eyes: Negative for discharge and redness.   Respiratory: Positive for apnea and cough. Negative for shortness of breath.    Cardiovascular: Negative for chest pain, palpitations and leg swelling.   Gastrointestinal: Negative for abdominal distention, abdominal pain, anal bleeding, blood in stool, constipation, diarrhea, nausea and vomiting.   Endocrine: Negative for cold intolerance,  heat intolerance and polydipsia.   Genitourinary: Negative for dysuria, hematuria and urgency.   Musculoskeletal: Negative for arthralgias, joint swelling and myalgias.   Skin: Negative for rash.   Allergic/Immunologic: Negative for food allergies and immunocompromised state.   Neurological: Negative for dizziness, seizures, syncope and headaches.   Hematological: Negative for adenopathy. Does not bruise/bleed easily.   Psychiatric/Behavioral: Negative for dysphoric mood. The patient is not nervous/anxious and is not hyperactive.      Patient Active Problem List   Diagnosis   • Hyperlipidemia   • Low vitamin D    • Hyperglycemia   • Calcium deficiency   • Elevated MCV   • Ileitis   • Lesion of colon   • History of colon polyps   • Diarrhea     Past Medical History:   Diagnosis Date   • Colon polyp    • History of exercise stress test     [facility] 20 YEARS AGO, REPORTS ALL WNL'S   • Hyperglycemia    • Renal calculi 2006    REPORTS NO SURGICAL INTERVENTION REQUIRED   • Seborrheic keratosis    • Umbilical hernia    • Vitamin D deficiency 2016     Past Surgical History:   Procedure Laterality Date   • ADENOIDECTOMY     • COLONOSCOPY  2016   • COLONOSCOPY N/A 11/15/2017    Procedure: COLONOSCOPY WITH HOT SNARE POLYPECTOMIES ; COLD SANRE POLYPECTOMIES , and cold biopsy polypectomies;  Surgeon: Dayne Rodríguez MD;  Location: [facility] ENDOSCOPY;  Service:    • OTHER SURGICAL HISTORY  1995    Jaw surgery   • TONSILLECTOMY       Family History   Problem Relation Age of Onset   • Heart failure Father    • No Known Problems Mother    • No Known Problems Sister    • Cirrhosis Brother    • Alcohol abuse Brother    • No Known Problems Maternal Aunt    • No Known Problems Maternal Uncle    • No Known Problems Paternal Aunt    • No Known Problems Paternal Uncle    • No Known Problems Maternal Grandmother    • No Known Problems Maternal Grandfather    • No Known Problems Paternal Grandmother    • No Known Problems Paternal  "Grandfather    • Celiac disease Neg Hx    • Colon polyps Neg Hx    • Crohn's disease Neg Hx    • Cystic fibrosis Neg Hx    • Esophageal cancer Neg Hx    • Hemochromatosis Neg Hx    • Inflammatory bowel disease Neg Hx    • Irritable bowel syndrome Neg Hx    • Liver cancer Neg Hx    • Liver disease Neg Hx    • Rectal cancer Neg Hx    • Stomach cancer Neg Hx    • Ulcerative colitis Neg Hx    • Clayton's disease Neg Hx    • Pancreatitis Neg Hx      Social History   Substance Use Topics   • Smoking status: Never Smoker   • Smokeless tobacco: Never Used   • Alcohol use No       Current Outpatient Prescriptions:   •  Cholecalciferol (VITAMIN D) 1000 UNITS tablet, Take 1,000 Units by mouth Daily., Disp: , Rfl:   •  Multiple Vitamin (MULTI VITAMIN MENS) tablet, Take 1 tablet by mouth Daily., Disp: , Rfl:     No Known Allergies    Blood pressure 168/87, pulse 67, temperature 98.1 °F (36.7 °C), resp. rate 18, height 180.3 cm (70.98\"), weight 82.6 kg (182 lb).    Physical Exam   Constitutional: He is oriented to person, place, and time. He appears well-developed and well-nourished. No distress.   HENT:   Head: Normocephalic and atraumatic.   Right Ear: Hearing and external ear normal.   Left Ear: Hearing and external ear normal.   Nose: Nose normal.   Mouth/Throat: Oropharynx is clear and moist and mucous membranes are normal. Mucous membranes are not pale, not dry and not cyanotic. No oral lesions. No oropharyngeal exudate.   Eyes: Conjunctivae and EOM are normal. Right eye exhibits no discharge. Left eye exhibits no discharge. No scleral icterus.   Neck: Trachea normal. Neck supple. No JVD present. No edema present. No thyroid mass and no thyromegaly present.   Cardiovascular: Normal rate, regular rhythm, S2 normal and normal heart sounds.  Exam reveals no gallop, no S3 and no friction rub.    No murmur heard.  Pulmonary/Chest: Effort normal and breath sounds normal. No respiratory distress. He has no wheezes. He has no rales. " He exhibits no tenderness.   Abdominal: Soft. Normal appearance and bowel sounds are normal. He exhibits no distension, no ascites and no mass. There is no splenomegaly or hepatomegaly. There is no tenderness. There is no rigidity, no rebound and no guarding. No hernia.   Musculoskeletal: He exhibits no tenderness or deformity.       Vascular Status -  His exam exhibits no right foot edema. His exam exhibits no left foot edema.  Lymphadenopathy:     He has no cervical adenopathy.        Left: No supraclavicular adenopathy present.   Neurological: He is alert and oriented to person, place, and time. He has normal strength. No cranial nerve deficit or sensory deficit. He exhibits normal muscle tone. Coordination normal.   Skin: No rash noted. He is not diaphoretic. No cyanosis. No pallor. Nails show no clubbing.   Psychiatric: He has a normal mood and affect. His behavior is normal. Judgment and thought content normal.   Nursing note and vitals reviewed.    Stigmata of chronic liver disease:  None.  Asterixis:  None.      Laboratory Testing:  Upon review of medical records:    Dated August 2, 2017 sodium 142 potassium 4.3 chloride 107 CO2 24 BUN 20 serum creatinine 1.00 glucose 101.  Calcium 8.2.  Albumin 3.50.  T bili 0.6 AST 31 ALT 35 alkaline phosphatase 64.  Total cholesterol 198.  Triglycerides 74.  TSH 3.470.  PSA 2.850.  Hepatitis C virus antibody negative.  WBC 4.98 hemoglobin 14.7 hematocrit 45.2 .4 platelet count 184.    Dated August 10, 2017 vitamin B12 level 487.  Folate level 16.90.     Procedures:   Upon review of medical records:      The patient had undergone a colonoscopy to terminal ileum on May 18, 2016 which upon review revealed: Multiple colon polyps, distal transverse colon flat lesion, granularity within the terminal ileum, and internal hemorrhoids.  The patient had multiple polypectomies, one endoscopic mucosal resection of the distal transverse colon lesion, thermal ablation of the  colonic lesion using argon plasma coagulator.  Biopsies from the terminal ileum revealed nonspecific active ileitis.  Biopsies from the colon polyps revealed tubular adenomata.  Biopsies from the distal transverse colon lesion revealed fragments of tubular adenomata.  Although focally increased cytology atypia was noted no definite high-grade dysplasia was seen.    Dated November 15, 2017 the patient underwent a colonoscopy to the terminal ileum which revealed: Pandiverticulosis more pronounced in the left colon with an occasional diverticulum within the right colon.  Erosions in focal areas of erythema within the terminal ileum-terminal ileitis.  Colon polyps.  Areas of previous resection appeared stable.  Internal hemorrhoids.  No endoscopic evidence of colitis was seen.  Random biopsies were obtained from the colon upon withdrawal of the scope.  Ascending colon polyp, biopsy revealed fragments of tubular adenoma.  No evidence of high-grade dysplasia or malignancy.  Sigmoid colon polyp, biopsy revealed hyperplastic polyp.  No evidence of dysplasia or malignancy.  Rectal polyps, biopsy revealed fragments of tubular adenoma without high-grade dysplasia and hyperplastic polyp without dysplasia.  Terminal ileum, biopsies revealed nonspecific focal active ileitis.  Fragments of benign terminal ileal mucosa with focal erosion with associated cryptitis and increased acute and chronic inflammation within the lamina propria and reactive epithelial changes.  The remainder of the fragments showed normal villous architecture with prominent lymphoid aggregates and reactive epithelial changes.  No evidence of significant crypt architectural distortion, basal plasmacytosis, dysplasia or malignancy.  Random colon biopsies revealed benign colonic mucosa with prominent lymphoid aggregate otherwise no specific pathologic diagnosis.  No evidence of significant acute inflammation, microscopic colitis, dysplasia or  malignancy.      Assessment and Plan:      Jonathan was seen today for follow-up.    Diagnoses and all orders for this visit:    Ileitis  -     Methylmalonic Acid, Serum; Future  -     Vitamin B12; Future    Adenomatous polyp of ascending colon    Diverticulosis of colon without hemorrhage  Comments:  Uncomplicated.              Plan  and Patient Instructions:    Patient Instructions   1. Low-fat-moderate fiber diet.  2. Check vitamin B12 and MMA levels.  Of interest the patient has terminal ileitis.  Retrospectively the patient was noted to have high MCV in the past.  The patient may call back regarding the results.  3. The patient should watch for diarrhea, right lower quadrant abdominal pain, or bleeding.  If so to seek medical attention.  4. A follow-up colonoscopy may be considered in 2 years or so to reevaluate the areas inncluding terminal ileum, sooner if the patient is symptomatic in terms of diarrhea, abdominal pain or bleeding.  5. Discussed with the patient in detail.  Opportunity was given to ask questions.          Dayne Rodríguez MD

## 2017-12-13 DIAGNOSIS — K52.9 ILEITIS: ICD-10-CM

## 2017-12-20 LAB
METHYLMALONATE SERPL-SCNC: 179 NMOL/L (ref 0–378)
VIT B12 SERPL-MCNC: 530 PG/ML (ref 239–931)

## 2018-12-05 NOTE — ADDENDUM NOTE
Addended by: MARCEL BATES on: 8/2/2017 09:03 AM     Modules accepted: Orders     Telephone Encounter by Ramón Johnson RN, BSN at 02/20/18 02:04 PM     Author:  Ramón Johnson RN, BSN Service:  (none) Author Type:  Registered Nurse     Filed:  02/20/18 02:06 PM Encounter Date:  2/20/2018 Status:  Signed     :  Ramón Johnson RN, BSN (Registered Nurse)             called Yoana Gutierrez for monthly assessment. She stated she was busy, couldn't talk and has an apt on Thursday, 2/22/18 with the doctor (urology). She ended the call quickly. I will follow up with her after apt has been completed.     Siena Palumbo RN-BSN  [[email or web address]]       Revision History        User Key Date/Time User Provider Type Action    > [email or web address] 02/20/18 02:06 PM Ramón Johnson RN, BSN Registered Nurse Sign    M - Manual

## 2019-01-28 ENCOUNTER — OFFICE VISIT (OUTPATIENT)
Dept: INTERNAL MEDICINE | Facility: CLINIC | Age: 74
End: 2019-01-28

## 2019-01-28 DIAGNOSIS — E58 CALCIUM DEFICIENCY: ICD-10-CM

## 2019-01-28 DIAGNOSIS — K63.9 LESION OF COLON: ICD-10-CM

## 2019-01-28 DIAGNOSIS — R79.89 LOW VITAMIN D LEVEL: ICD-10-CM

## 2019-01-28 DIAGNOSIS — E55.9 VITAMIN D DEFICIENCY: ICD-10-CM

## 2019-01-28 DIAGNOSIS — E78.5 HYPERLIPIDEMIA, UNSPECIFIED HYPERLIPIDEMIA TYPE: Primary | ICD-10-CM

## 2019-01-28 DIAGNOSIS — R73.9 HYPERGLYCEMIA: ICD-10-CM

## 2019-01-28 DIAGNOSIS — R31.29 OTHER MICROSCOPIC HEMATURIA: ICD-10-CM

## 2019-01-28 PROBLEM — Z86.010 HISTORY OF COLON POLYPS: Status: RESOLVED | Noted: 2017-09-22 | Resolved: 2019-01-28

## 2019-01-28 PROBLEM — R19.7 DIARRHEA: Status: RESOLVED | Noted: 2017-09-22 | Resolved: 2019-01-28

## 2019-01-28 PROBLEM — K52.9 ILEITIS: Status: RESOLVED | Noted: 2017-09-22 | Resolved: 2019-01-28

## 2019-01-28 PROBLEM — R71.8 ELEVATED MCV: Status: RESOLVED | Noted: 2017-08-10 | Resolved: 2019-01-28

## 2019-01-28 PROCEDURE — 99397 PER PM REEVAL EST PAT 65+ YR: CPT | Performed by: INTERNAL MEDICINE

## 2019-01-28 PROCEDURE — 96160 PT-FOCUSED HLTH RISK ASSMT: CPT | Performed by: INTERNAL MEDICINE

## 2019-01-28 PROCEDURE — G0439 PPPS, SUBSEQ VISIT: HCPCS | Performed by: INTERNAL MEDICINE

## 2019-01-28 NOTE — PROGRESS NOTES
QUICK REFERENCE INFORMATION:  The ABCs of the Annual Wellness Visit    Subsequent Medicare Wellness Visit    HEALTH RISK ASSESSMENT    1945    Recent Hospitalizations:  No hospitalization(s) within the last year..        Current Medical Providers:  Patient Care Team:  Gteachew Zhou MD as PCP - General        Smoking Status:  Social History     Tobacco Use   Smoking Status Never Smoker   Smokeless Tobacco Never Used       Alcohol Consumption:  Social History     Substance and Sexual Activity   Alcohol Use No       Depression Screen:   PHQ-2/PHQ-9 Depression Screening 1/28/2019   Little interest or pleasure in doing things 0   Feeling down, depressed, or hopeless 0   Total Score 0       Health Habits and Functional and Cognitive Screening:  Functional & Cognitive Status 1/28/2019   Do you have difficulty preparing food and eating? No   Do you have difficulty bathing yourself, getting dressed or grooming yourself? No   Do you have difficulty using the toilet? No   Do you have difficulty moving around from place to place? No   Do you have trouble with steps or getting out of a bed or a chair? No   In the past year have you fallen or experienced a near fall? No   Current Diet Well Balanced Diet   Dental Exam Up to date   Eye Exam Up to date   Exercise (times per week) 7 times per week   Current Exercise Activities Include Walking   Do you need help using the phone?  No   Are you deaf or do you have serious difficulty hearing?  No   Do you need help with transportation? No   Do you need help shopping? No   Do you need help preparing meals?  No   Do you need help with housework?  No   Do you need help with laundry? No   Do you need help taking your medications? No   Do you need help managing money? No   Do you ever drive or ride in a car without wearing a seat belt? No   Have you felt unusual stress, anger or loneliness in the last month? No   Who do you live with? Spouse   If you need help, do you have trouble  finding someone available to you? No   Have you been bothered in the last four weeks by sexual problems? No   Do you have difficulty concentrating, remembering or making decisions? No           Does the patient have evidence of cognitive impairment? No    Aspirin use counseling: Does not need ASA (and currently is not on it)      Recent Lab Results:  CMP:  Lab Results   Component Value Date     (H) 08/02/2017    BUN 20 08/02/2017    CREATININE 1.00 08/02/2017    EGFRIFNONA 73 08/02/2017    EGFRIFAFRI 89 08/02/2017    BCR 20.0 08/02/2017     08/02/2017    K 4.3 08/02/2017    CO2 24.0 (L) 08/02/2017    CALCIUM 8.2 (L) 08/02/2017    PROTENTOTREF 6.5 08/02/2017    ALBUMIN 3.50 08/02/2017    LABGLOBREF 3.0 08/02/2017    LABIL2 1.2 08/02/2017    BILITOT 0.6 08/02/2017    ALKPHOS 64 08/02/2017    AST 31 08/02/2017    ALT 35 08/02/2017     Lipid Panel:  Lab Results   Component Value Date    TRIG 74 08/02/2017    HDL 56 08/02/2017    VLDL 14.8 08/02/2017     HbA1c:  Lab Results   Component Value Date    HGBA1C 5.70 08/02/2017       Visual Acuity:  No exam data present    Age-appropriate Screening Schedule:  Refer to the list below for future screening recommendations based on patient's age, sex and/or medical conditions. Orders for these recommended tests are listed in the plan section. The patient has been provided with a written plan.    Health Maintenance   Topic Date Due   • ZOSTER VACCINE (2 of 2) 09/27/2017   • INFLUENZA VACCINE  08/01/2018   • PNEUMOCOCCAL VACCINES (65+ LOW/MEDIUM RISK) (2 of 2 - PPSV23) 08/02/2018   • LIPID PANEL  08/02/2018   • TDAP/TD VACCINES (2 - Td) 08/02/2027   • COLONOSCOPY  11/15/2027        Subjective   History of Present Illness    Jonathan Esqueda Jr. is a 73 y.o. male who presents for an Subsequent Wellness Visit.    The following portions of the patient's history were reviewed and updated as appropriate: allergies, current medications, past family history, past medical  history, past social history, past surgical history and problem list.    Outpatient Medications Prior to Visit   Medication Sig Dispense Refill   • Cholecalciferol (VITAMIN D) 1000 UNITS tablet Take 1,000 Units by mouth Daily.     • Multiple Vitamin (MULTI VITAMIN MENS) tablet Take 1 tablet by mouth Daily.       No facility-administered medications prior to visit.        Patient Active Problem List   Diagnosis   • Hyperlipidemia   • Low vitamin D    • Hyperglycemia   • Calcium deficiency   • Elevated MCV   • Ileitis   • Lesion of colon   • History of colon polyps   • Diarrhea       Advance Care Planning:  has NO advance directive - not interested in additional information    Identification of Risk Factors:  Risk factors include: inactivity.    Review of Systems   Constitutional: Negative.    HENT: Negative.    Eyes: Negative.    Respiratory: Negative.    Cardiovascular: Negative.    Gastrointestinal: Negative.    Endocrine: Negative.    Genitourinary: Negative.    Musculoskeletal: Negative.    Skin: Negative.    Allergic/Immunologic: Negative.    Neurological: Negative.    Hematological: Negative.    Psychiatric/Behavioral: Negative.    All other systems reviewed and are negative.      Compared to one year ago, the patient feels his physical health is the same.  Compared to one year ago, the patient feels his mental health is the same.    Objective     Physical Exam   Constitutional: He is oriented to person, place, and time. He appears well-developed and well-nourished.   HENT:   Head: Normocephalic and atraumatic.   Right Ear: External ear normal.   Left Ear: External ear normal.   Nose: Nose normal.   Mouth/Throat: Oropharynx is clear and moist.   Eyes: Conjunctivae and EOM are normal. Pupils are equal, round, and reactive to light.   Neck: Normal range of motion. Neck supple. No thyromegaly present.   Cardiovascular: Normal rate, regular rhythm, normal heart sounds and intact distal pulses.   Pulmonary/Chest:  "Effort normal and breath sounds normal.   Abdominal: Soft. Bowel sounds are normal.   Genitourinary: Rectum normal, prostate normal and penis normal.   Musculoskeletal: Normal range of motion.   Neurological: He is alert and oriented to person, place, and time. He has normal reflexes.   Skin: Skin is warm and dry.   Psychiatric: He has a normal mood and affect. His behavior is normal. Judgment and thought content normal.   Nursing note and vitals reviewed.      Vitals:    01/28/19 1417   BP: 120/78   Pulse: 85   SpO2: 97%   Weight: 77.1 kg (170 lb)   Height: 177.8 cm (70\")       Patient's Body mass index is 24.39 kg/m². BMI is within normal parameters. No follow-up required..      Assessment/Plan   Patient Self-Management and Personalized Health Advice  The patient has been provided with information about: diet and preventive services including:   · Advance directive.    Visit Diagnoses:  No diagnosis found.    No orders of the defined types were placed in this encounter.      Outpatient Encounter Medications as of 1/28/2019   Medication Sig Dispense Refill   • Cholecalciferol (VITAMIN D) 1000 UNITS tablet Take 1,000 Units by mouth Daily.     • Multiple Vitamin (MULTI VITAMIN MENS) tablet Take 1 tablet by mouth Daily.       No facility-administered encounter medications on file as of 1/28/2019.        Reviewed use of high risk medication in the elderly: no  Reviewed for potential of harmful drug interactions in the elderly: no    Follow Up:  No Follow-up on file.     An After Visit Summary and PPPS with all of these plans were given to the patient.      1.   Colon:Pandiverticulosis more pronounced in the left colon within occasional diverticulum within the right colon.Erosions and focal areas of erythema within the terminal ileum-terminal ileitis.Colon polyps.Areas of previous resection appeared stable.Internal hemorrhoids.No endoscopic evidence of colitis was seen.  Random biopsies were obtained from the colon upon " withdrawal of the scope.  umbilical hernia watch   SK right ear and left upper back watch   HLD diet  hyperglycemia diet  Nocturia occa, PSA nl  vitD low continue vitD3 1000u daily  zostavax. prevnar and pneumovax Td and flu vaccines all refused by patient  Calcium low, do Ion Ca++  MCV high , do lab      annual wellness

## 2019-02-04 VITALS
HEIGHT: 70 IN | SYSTOLIC BLOOD PRESSURE: 120 MMHG | DIASTOLIC BLOOD PRESSURE: 78 MMHG | WEIGHT: 170 LBS | BODY MASS INDEX: 24.34 KG/M2 | OXYGEN SATURATION: 97 % | HEART RATE: 85 BPM

## 2019-10-28 ENCOUNTER — OFFICE VISIT (OUTPATIENT)
Dept: GASTROENTEROLOGY | Facility: CLINIC | Age: 74
End: 2019-10-28

## 2019-10-28 VITALS
TEMPERATURE: 98.2 F | DIASTOLIC BLOOD PRESSURE: 76 MMHG | SYSTOLIC BLOOD PRESSURE: 134 MMHG | BODY MASS INDEX: 25.48 KG/M2 | WEIGHT: 178 LBS | HEIGHT: 70 IN | HEART RATE: 80 BPM | RESPIRATION RATE: 18 BRPM

## 2019-10-28 DIAGNOSIS — Z86.010 HISTORY OF COLON POLYPS: Primary | ICD-10-CM

## 2019-10-28 DIAGNOSIS — K52.9 ILEITIS: ICD-10-CM

## 2019-10-28 DIAGNOSIS — K63.9 LESION OF COLON: ICD-10-CM

## 2019-10-28 DIAGNOSIS — D12.2 ADENOMATOUS POLYP OF ASCENDING COLON: ICD-10-CM

## 2019-10-28 PROCEDURE — 99214 OFFICE O/P EST MOD 30 MIN: CPT | Performed by: INTERNAL MEDICINE

## 2019-10-28 NOTE — PATIENT INSTRUCTIONS
1. Colonoscopy for further evaluation of terminal ileitis and stability of the resected lesion: Description of the procedure, risks benefits alternatives and options including non-operative options were discussed with the patient in detail.  The patient understands and wishes to proceed.

## 2019-10-28 NOTE — PROGRESS NOTES
Chief Complaint   Patient presents with   • Colon Polyps   • Terminal ileitis     History of Present Illness     For colon cancer screening.  The patient has history of colon polyps as well as 1.5 cm flat lesion in the transverse colon biopsies revealed tubular adenomata.  He had undergone a colonoscopy to terminal ileum in November 2017.  The patient had 9 small polyps removed (biopsies from the ascending colon and rectum revealed tubular adenomata).  The patient was also noted to have terminal ileitis-erosions and erythema biopsies of which revealed nonspecific ileitis.  The patient denies diarrhea or constipation.  There is no history of abdominal pain.  There is no history of overt GI bleed (hematemesis melena or hematochezia).  The patient denies nausea or vomiting.  The patient denies reflux.  The patient denies dysphagia or odynophagia.  There is no history of recent significant weight loss.  There is no history of liver or pancreatic disease.       Review of Systems   Constitutional: Negative for appetite change, chills, fatigue, fever and unexpected weight change.   HENT: Negative for mouth sores, nosebleeds and trouble swallowing.    Eyes: Negative for discharge and redness.   Respiratory: Negative for apnea, cough and shortness of breath.    Cardiovascular: Negative for chest pain, palpitations and leg swelling.   Gastrointestinal: Negative for abdominal distention, abdominal pain, anal bleeding, blood in stool, constipation, diarrhea, nausea and vomiting.   Endocrine: Negative for cold intolerance, heat intolerance and polydipsia.   Genitourinary: Negative for dysuria, hematuria and urgency.   Musculoskeletal: Negative for arthralgias, joint swelling and myalgias.   Skin: Negative for rash.   Allergic/Immunologic: Negative for food allergies and immunocompromised state.   Neurological: Negative for dizziness, seizures, syncope and headaches.   Hematological: Negative for adenopathy. Does not bruise/bleed  easily.   Psychiatric/Behavioral: Negative for dysphoric mood. The patient is not nervous/anxious and is not hyperactive.      Patient Active Problem List   Diagnosis   • Hyperlipidemia   • Low vitamin D    • Hyperglycemia   • Calcium deficiency     Past Medical History:   Diagnosis Date   • Colon polyp    • History of exercise stress test     ROCHELLEATLEY 20 YEARS AGO, REPORTS ALL WNL'S   • Hyperglycemia    • Renal calculi 2006    REPORTS NO SURGICAL INTERVENTION REQUIRED   • Seborrheic keratosis    • Umbilical hernia    • Vitamin D deficiency 2016     Past Surgical History:   Procedure Laterality Date   • ADENOIDECTOMY     • COLONOSCOPY  2016   • COLONOSCOPY N/A 11/15/2017    Procedure: COLONOSCOPY WITH HOT SNARE POLYPECTOMIES ; COLD SANRE POLYPECTOMIES , and cold biopsy polypectomies;  Surgeon: Dayne Rodríguez MD;  Location: [facility] ENDOSCOPY;  Service:    • OTHER SURGICAL HISTORY  1995    Jaw surgery   • TONSILLECTOMY       Family History   Problem Relation Age of Onset   • Heart failure Father    • No Known Problems Mother    • No Known Problems Sister    • Cirrhosis Brother    • Alcohol abuse Brother    • No Known Problems Maternal Aunt    • No Known Problems Maternal Uncle    • No Known Problems Paternal Aunt    • No Known Problems Paternal Uncle    • No Known Problems Maternal Grandmother    • No Known Problems Maternal Grandfather    • No Known Problems Paternal Grandmother    • No Known Problems Paternal Grandfather    • Celiac disease Neg Hx    • Colon polyps Neg Hx    • Crohn's disease Neg Hx    • Cystic fibrosis Neg Hx    • Esophageal cancer Neg Hx    • Hemochromatosis Neg Hx    • Inflammatory bowel disease Neg Hx    • Irritable bowel syndrome Neg Hx    • Liver cancer Neg Hx    • Liver disease Neg Hx    • Rectal cancer Neg Hx    • Stomach cancer Neg Hx    • Ulcerative colitis Neg Hx    • Clayton's disease Neg Hx    • Pancreatitis Neg Hx      Social History     Tobacco Use   • Smoking status: Never Smoker  "  • Smokeless tobacco: Never Used   Substance Use Topics   • Alcohol use: No       Current Outpatient Medications:   •  Cholecalciferol (VITAMIN D) 1000 UNITS tablet, Take 1,000 Units by mouth Daily., Disp: , Rfl:   •  Multiple Vitamin (MULTI VITAMIN MENS) tablet, Take 1 tablet by mouth Daily., Disp: , Rfl:     No Known Allergies    Blood pressure 134/76, pulse 80, temperature 98.2 °F (36.8 °C), resp. rate 18, height 177.8 cm (70\"), weight 80.7 kg (178 lb).    Physical Exam   Constitutional: He is oriented to person, place, and time. He appears well-developed and well-nourished. No distress.   HENT:   Head: Normocephalic and atraumatic.   Right Ear: Hearing and external ear normal.   Left Ear: Hearing and external ear normal.   Nose: Nose normal.   Mouth/Throat: Oropharynx is clear and moist and mucous membranes are normal. Mucous membranes are not pale, not dry and not cyanotic. No oral lesions. No oropharyngeal exudate.   Eyes: Conjunctivae and EOM are normal. Right eye exhibits no discharge. Left eye exhibits no discharge. No scleral icterus.   Neck: Trachea normal. Neck supple. No JVD present. No edema present. No thyroid mass and no thyromegaly present.   Cardiovascular: Normal rate, regular rhythm, S2 normal and normal heart sounds. Exam reveals no gallop, no S3 and no friction rub.   No murmur heard.  Pulmonary/Chest: Effort normal and breath sounds normal. No respiratory distress. He has no wheezes. He has no rales. He exhibits no tenderness.   Abdominal: Soft. Normal appearance and bowel sounds are normal. He exhibits no distension, no ascites and no mass. There is no splenomegaly or hepatomegaly. There is no tenderness. There is no rigidity, no rebound and no guarding. No hernia.   Musculoskeletal: He exhibits no tenderness or deformity.     Vascular Status -  His right foot exhibits no edema. His left foot exhibits no edema.  Lymphadenopathy:     He has no cervical adenopathy.        Left: No " supraclavicular adenopathy present.   Neurological: He is alert and oriented to person, place, and time. He has normal strength. No cranial nerve deficit or sensory deficit. He exhibits normal muscle tone. Coordination normal.   Skin: No rash noted. He is not diaphoretic. No cyanosis. No pallor. Nails show no clubbing.   Psychiatric: He has a normal mood and affect. His behavior is normal. Judgment and thought content normal.   Nursing note and vitals reviewed.  Stigmata of chronic liver disease:  None.  Asterixis:  None.    Laboratory Testing:  Upon review of medical records:    Dated August 2, 2017 sodium 142 potassium 4.3 chloride 107 CO2 24 BUN 20 serum creatinine 1.00 glucose 101.  Calcium 8.2.  Albumin 3.50.  T bili 0.6 AST 31 ALT 35 alkaline phosphatase 64.  Total cholesterol 198.  Triglycerides 74.  TSH 3.470.  PSA 2.850.  Hepatitis C virus antibody negative.  WBC 4.98 hemoglobin 14.7 hematocrit 45.2 .4 platelet count 184.     Dated August 10, 2017 vitamin B12 level 487.  Folate level 16.90.     Dated December 13, 2017 vitamin B12 level 530.  MMA level 179.     Procedures:   Upon review of medical records:      The patient had undergone a colonoscopy to terminal ileum on May 18, 2016 which upon review revealed: Multiple colon polyps, distal transverse colon flat lesion, granularity within the terminal ileum, and internal hemorrhoids.  The patient had multiple polypectomies, one endoscopic mucosal resection of the distal transverse colon lesion, thermal ablation of the colonic lesion using argon plasma coagulator.  Biopsies from the terminal ileum revealed nonspecific active ileitis.  Biopsies from the colon polyps revealed tubular adenomata.  Biopsies from the distal transverse colon lesion revealed fragments of tubular adenomata.  Although focally increased cytology atypia was noted no definite high-grade dysplasia was seen.     Dated November 15, 2017 the patient underwent a colonoscopy to the  terminal ileum which revealed: Pandiverticulosis more pronounced in the left colon with an occasional diverticulum within the right colon.  Erosions in focal areas of erythema within the terminal ileum-terminal ileitis.  Colon polyps.  Areas of previous resection appeared stable.  Internal hemorrhoids.  No endoscopic evidence of colitis was seen.  Random biopsies were obtained from the colon upon withdrawal of the scope.  Ascending colon polyp, biopsy revealed fragments of tubular adenoma.  No evidence of high-grade dysplasia or malignancy.  Sigmoid colon polyp, biopsy revealed hyperplastic polyp.  No evidence of dysplasia or malignancy.  Rectal polyps, biopsy revealed fragments of tubular adenoma without high-grade dysplasia and hyperplastic polyp without dysplasia.  Terminal ileum, biopsies revealed nonspecific focal active ileitis.  Fragments of benign terminal ileal mucosa with focal erosion with associated cryptitis and increased acute and chronic inflammation within the lamina propria and reactive epithelial changes.  The remainder of the fragments showed normal villous architecture with prominent lymphoid aggregates and reactive epithelial changes.  No evidence of significant crypt architectural distortion, basal plasmacytosis, dysplasia or malignancy.  Random colon biopsies revealed benign colonic mucosa with prominent lymphoid aggregate otherwise no specific pathologic diagnosis.  No evidence of significant acute inflammation, microscopic colitis, dysplasia or malignancy.    Assessment:      ICD-10-CM ICD-9-CM   1. History of colon polyps Z86.010 V12.72   2. Lesion of colon K63.9 569.89   3. Ileitis K52.9 558.9   4. Adenomatous polyp of ascending colon D12.2 211.3         Discussion:  1.     Plan/  Patient Instructions   1. Colonoscopy for further evaluation of terminal ileitis and stability of the resected lesion: Description of the procedure, risks benefits alternatives and options including non-operative  options were discussed with the patient in detail.  The patient understands and wishes to proceed.         Dayne Rodríguez MD

## 2019-10-29 ENCOUNTER — PREP FOR SURGERY (OUTPATIENT)
Dept: OTHER | Facility: HOSPITAL | Age: 74
End: 2019-10-29

## 2019-10-29 DIAGNOSIS — K52.9 ILEITIS: ICD-10-CM

## 2019-10-29 DIAGNOSIS — Z12.11 COLON CANCER SCREENING: ICD-10-CM

## 2019-10-29 DIAGNOSIS — K63.9 LESION OF COLON: Primary | ICD-10-CM

## 2019-10-29 DIAGNOSIS — Z86.010 HISTORY OF COLON POLYPS: ICD-10-CM

## 2019-10-29 RX ORDER — SODIUM CHLORIDE 9 MG/ML
70 INJECTION, SOLUTION INTRAVENOUS CONTINUOUS PRN
Status: CANCELLED | OUTPATIENT
Start: 2019-11-06

## 2019-10-31 PROBLEM — K52.9 ILEITIS: Status: ACTIVE | Noted: 2019-10-31

## 2019-10-31 PROBLEM — Z12.11 COLON CANCER SCREENING: Status: ACTIVE | Noted: 2019-10-31

## 2019-10-31 PROBLEM — Z86.010 HISTORY OF COLON POLYPS: Status: ACTIVE | Noted: 2019-10-31

## 2019-10-31 PROBLEM — K63.9 LESION OF COLON: Status: ACTIVE | Noted: 2019-10-31

## 2019-11-06 ENCOUNTER — ANESTHESIA EVENT (OUTPATIENT)
Dept: GASTROENTEROLOGY | Facility: HOSPITAL | Age: 74
End: 2019-11-06

## 2019-11-06 ENCOUNTER — ANESTHESIA (OUTPATIENT)
Dept: GASTROENTEROLOGY | Facility: HOSPITAL | Age: 74
End: 2019-11-06

## 2019-11-06 ENCOUNTER — HOSPITAL ENCOUNTER (OUTPATIENT)
Facility: HOSPITAL | Age: 74
Setting detail: HOSPITAL OUTPATIENT SURGERY
Discharge: HOME | End: 2019-11-06
Attending: INTERNAL MEDICINE | Admitting: INTERNAL MEDICINE

## 2019-11-06 VITALS
OXYGEN SATURATION: 99 % | SYSTOLIC BLOOD PRESSURE: 145 MMHG | DIASTOLIC BLOOD PRESSURE: 82 MMHG | HEART RATE: 88 BPM | TEMPERATURE: 97.6 F | WEIGHT: 175 LBS | RESPIRATION RATE: 16 BRPM | HEIGHT: 70 IN | BODY MASS INDEX: 25.05 KG/M2

## 2019-11-06 DIAGNOSIS — Z12.11 COLON CANCER SCREENING: ICD-10-CM

## 2019-11-06 DIAGNOSIS — K52.9 ILEITIS: ICD-10-CM

## 2019-11-06 DIAGNOSIS — K63.9 LESION OF COLON: ICD-10-CM

## 2019-11-06 DIAGNOSIS — Z86.010 HISTORY OF COLON POLYPS: ICD-10-CM

## 2019-11-06 PROCEDURE — 25010000002 PROPOFOL 10 MG/ML EMULSION: Performed by: NURSE ANESTHETIST, CERTIFIED REGISTERED

## 2019-11-06 PROCEDURE — 45385 COLONOSCOPY W/LESION REMOVAL: CPT | Performed by: INTERNAL MEDICINE

## 2019-11-06 PROCEDURE — 45380 COLONOSCOPY AND BIOPSY: CPT | Performed by: INTERNAL MEDICINE

## 2019-11-06 RX ORDER — PROPOFOL 10 MG/ML
VIAL (ML) INTRAVENOUS AS NEEDED
Status: DISCONTINUED | OUTPATIENT
Start: 2019-11-06 | End: 2019-11-06 | Stop reason: SURG

## 2019-11-06 RX ORDER — LIDOCAINE 50 MG/G
OINTMENT TOPICAL AS NEEDED
Status: DISCONTINUED | OUTPATIENT
Start: 2019-11-06 | End: 2019-11-06 | Stop reason: HOSPADM

## 2019-11-06 RX ORDER — SIMETHICONE 20 MG/.3ML
EMULSION ORAL AS NEEDED
Status: DISCONTINUED | OUTPATIENT
Start: 2019-11-06 | End: 2019-11-06 | Stop reason: HOSPADM

## 2019-11-06 RX ORDER — LIDOCAINE HYDROCHLORIDE 20 MG/ML
INJECTION, SOLUTION INTRAVENOUS AS NEEDED
Status: DISCONTINUED | OUTPATIENT
Start: 2019-11-06 | End: 2019-11-06 | Stop reason: SURG

## 2019-11-06 RX ORDER — SODIUM CHLORIDE 9 MG/ML
70 INJECTION, SOLUTION INTRAVENOUS CONTINUOUS PRN
Status: DISCONTINUED | OUTPATIENT
Start: 2019-11-06 | End: 2019-11-06 | Stop reason: HOSPADM

## 2019-11-06 RX ADMIN — PROPOFOL 120 MCG/KG/MIN: 10 INJECTION, EMULSION INTRAVENOUS at 13:04

## 2019-11-06 RX ADMIN — SODIUM CHLORIDE 70 ML/HR: 9 INJECTION, SOLUTION INTRAVENOUS at 10:39

## 2019-11-06 RX ADMIN — LIDOCAINE HYDROCHLORIDE 80 MG: 20 INJECTION, SOLUTION INTRAVENOUS at 13:04

## 2019-11-06 RX ADMIN — PROPOFOL 50 MG: 10 INJECTION, EMULSION INTRAVENOUS at 13:04

## 2019-11-06 NOTE — INTERVAL H&P NOTE
"  H&P reviewed. The patient was examined and there are no changes to the H&P.       No recent shortness of breath or chest pains.      Blood pressure 145/89, pulse 72, temperature 97.1 °F (36.2 °C), temperature source Temporal, resp. rate 16, height 177.8 cm (70\"), weight 79.4 kg (175 lb), SpO2 98 %.    Chest: clear to auscultation.  Cardiac exam: No S3 no murmurs.   Abdomen: soft bowel sounds present nondistended nontender.        "

## 2019-11-06 NOTE — ANESTHESIA PREPROCEDURE EVALUATION
Anesthesia Evaluation     Patient summary reviewed and Nursing notes reviewed   no history of anesthetic complications:  NPO Solid Status: > 8 hours  NPO Liquid Status: > 8 hours           Airway   Mallampati: I  TM distance: >3 FB  Neck ROM: full  no difficulty expected  Dental - normal exam     Pulmonary - negative pulmonary ROS and normal exam   Cardiovascular - normal exam    (+) hyperlipidemia,       Neuro/Psych- negative ROS  GI/Hepatic/Renal/Endo    (+)   renal disease stones,     Musculoskeletal (-) negative ROS    Abdominal    Substance History - negative use     OB/GYN negative ob/gyn ROS         Other - negative ROS                         Anesthesia Plan    ASA 2     MAC   (Risks and benefits discussed including risk of aspiration, recall and dental damage. All patient questions answered. Will continue with POC.)  intravenous induction     Anesthetic plan, all risks, benefits, and alternatives have been provided, discussed and informed consent has been obtained with: patient.

## 2019-11-06 NOTE — DISCHARGE INSTRUCTIONS
No pushing, pulling, tugging,  heavy lifting, or strenuous activity.  No major decision making, driving, or drinking alcoholic beverages for 24 hours. ( due to the medications you have  received)  Always use good hand hygiene/washing techniques.  NO driving while taking pain medications.    To assist you in voiding:  Drink plenty of fluids  Listen to running water while attempting to void.    If you are unable to urinate and you have an uncomfortable urge to void or it has been   6 hours since you were discharged, return to the Emergency Room      ************************************************************************************    Postprocedure instructions:    1. Nothing by mouth to fully alert.  2. Once fully alert may have clear liquid diet.  3. Advance diet as tolerated.  4. Vital signs as routine.    Diet:     1. Low-fat diet.  2. Simon's All Bran-Bran Buds 1/4 cup daily.  3. May add Honey Bunches of Oats with Almonds 1/4 cup for taste.  4. Use almond milk, 1 or 2% milk.  5. Drink liberal amounts of water.    Blood Thinner Directions:    Avoid Aspirin & other NSAIDS for _7__ days. Tylenol is okay.    Treatments:    Other Instructions:    Call [facility] at [phone] or come to the Emergency Department if you experience the following: Chest pain, abdominal pain, bleeding (vomiting of blood or coffee colored material, black stools or zee blood in stools), fever/chills, nausea and vomiting or dizziness.      Follow-up: The patient may call the office in 1 week regarding biopsy results.  However, the patient may follow-up in the office in 4 weeks if desired.  Office phone # [phone].    Follow-up colonoscopy: Possibly in 5 years if overall health permits and the patient desires.      ************************************************************************************    Notes to the patient and the family from Dr. Rodríguez.    Dear patient/family member,    Today your colon was examined  extensively from rectum to cecum and beyond into the small intestine twice.   Findings on today's procedure are as follows:    1. Diverticulosis. These are benign outpouchings in the colon.   2. Small Colon polyps. 3 were found and removed.   3. An area of previous resection marked by submucosal injection of Kristine ink for marking.  In the past appeared to be stable.  This was biopsied.  4. Previously noted inflammation in the small intestine is noted to be healed.  There is no further inflammation of the small intestine.  5. A Redundant fold in the left colon was noted.  This was biopsied.  This can be a normal variation.  6. No cancer. No inflammation or colitis. No suggestion of Crohn's disease.  7. Internal hemorrhoids.    Recommendations:    1. As above.  2. If hemorrhoidal problems: Use CORTIZONE 10 OINTMENT (hydrocortisone 1% ointment) over the counter. AVOID CREAM OR GEL.  Apply anorectally  2-3 times a day for 1 week.  May need to use a liner to protect the garments.        Should you have more questions please do not hesitate to talk to the nurse who can call me and let me talk to you.      I hope you feel better.    Dayne Rodríguez M.D., FACP, FACG.

## 2019-11-06 NOTE — OP NOTE
PROCEDURE:  Colonoscopy to the terminal ileum with 2 cold snare and one cold biopsy polypectomies as well as biopsies.    DATE OF PROCEDURE:  November 6, 2019    REFERRING PROVIDER:  Getachew Zhou MD     INSTRUMENT USED: Olympus PCF H 190 videocolonoscope.      INDICATIONS OF THE PROCEDURE: This is a 74-year-old white male for colon cancer screening.  There is history of colonic lesion that had been resected.  The patient has history of multiple colon polyps as well as ileitis.  Currently undergoing colonoscopy for further evaluation.    PREVIOUS COLONOSCOPY: 2017.    BIOPSIES: Biopsies were obtained from the area of previous resection-marked by Kristine ink in the transverse colon.  A cold biopsy polypectomy was performed in the descending colon.  Multiple biopsies were obtained from the polypoid redundant erythematous area in the distal descending colon-proximal sigmoid colon.  2 cold snare polypectomies were performed in the rectum.      PHOTOGRAPHS:  Photographs were included in the medical records.     MEDICATIONS:  MAC.       CONSENT/PREPROCEDURE EVALUATION:  Risks, benefits, alternatives and options of the procedure including risks of sedation/anesthesia were discussed with the patient and informed consent was obtained prior to the procedure.  History and physical examination were performed and nothing precluded the test.      REPORT:  The patient was placed in left lateral decubitus position and a digital examination was performed.  Once under the influence of IV sedation, the instrument was inserted into the rectum and advanced under direct vision to cecum which was identified by the ileocecal valve, triradiate folds and appendiceal orifice. The scope was then maneuvered into the terminal ileum.        FINDINGS:      Digital rectal examination:  Good anal tone.  No perianal pathology.  No mass.        Terminal ileum:  7-8 cm.  Normal.     Cecum and ascending colon: Diverticulosis.     Hepatic flexure,  transverse colon, splenic flexure: Diverticulosis.  An area of resection previously marked by Kristine ink was noted in the transverse colon.  This area was noted to be stable.  Biopsies were obtained in this area.     Descending colon, sigmoid colon and rectum: Diverticulosis.  3 mm sessile polyp in the descending colon was removed with cold biopsy forceps.  In the distal descending colon-proximal sigmoid colon area of erythematous redundant fold was seen.  This was biopsied.  2, 5 mm sessile polyps in the rectum were removed with cold snare.  A retroflex examination within the rectum revealed internal hemorrhoids.        The scope was then straightened, the lower GI tract was decompressed, and the scope was pulled out of the patient.  The patient tolerated the procedure well.  There were no immediate complications and the patient was transferred in stable condition for post procedure observation.      TECHNICAL DATA:   1. [place] prep score: 7 (2+2+3).    2. Anus to cecal time: 2  minutes.  3. Difficulty of examination:  Average.    4. Withdrawal time: 16 minutes.  5. Procedure time: 23 minutes  6. Retroflex examination in right colon: Yes.    7. Second look Rectum to cecum with decompression: Yes.    DIAGNOSES:    1. Pandiverticulosis.  2. Colon polyps.  3 were removed.  3-5 mm in size.  3. Area of previous resection in the transverse colon marked by Kristine ink stable in appearance.  Biopsies were obtained.  4. An area of erythematous redundant fold in the distal descending colon-proximal sigmoid colon.  Status post biopsies.  5. Internal hemorrhoids.  6. No endoscopic suggestion of ileitis.      RECOMMENDATIONS:     1. Dietary instructions.  2. Follow biopsies.    3. Follow-up: The patient may call the office in 1 week regarding biopsy results.  However, the patient may follow-up in the office in 1 weeks if desired.  4. Followup colonoscopy: A possible colonoscopy may be considered in 5 years if overall health  permits and the patient desires.          Thank you very much for letting me participate in the care of this patient. Please do not hesitate to call me if you have any questions.

## 2019-11-06 NOTE — ANESTHESIA POSTPROCEDURE EVALUATION
Patient: Jonathan Esqueda Jr.    Procedure Summary     Date:  11/06/19 Room / Location:  [facility] ENDOSCOPY 2 / [facility] ENDOSCOPY    Anesthesia Start:  1251 Anesthesia Stop:      Procedure:  COLONOSCOPY WITH COLD SNARE POLYPECTOMY, COLD BIOPSY POLYPECTOMIES, AND BIOPSY (N/A Anus) Diagnosis:       Lesion of colon      Ileitis      History of colon polyps      Colon cancer screening      (Lesion of colon [K63.9])      (Ileitis [K52.9])      (History of colon polyps [Z86.010])      (Colon cancer screening [Z12.11])    Surgeon:  Dayne Rodríguez MD Provider:  Charly Mcgrath CRNA    Anesthesia Type:  MAC ASA Status:  2          Anesthesia Type: MAC  Last vitals  BP   117/75   Temp   97   Pulse   66   Resp   18   SpO2   97     Post Anesthesia Care and Evaluation    Patient location during evaluation: bedside  Patient participation: complete - patient participated  Level of consciousness: awake and alert  Pain score: 0  Pain management: adequate  Airway patency: patent  Anesthetic complications: No anesthetic complications  PONV Status: none  Cardiovascular status: acceptable  Respiratory status: acceptable and nasal cannula  Hydration status: acceptable

## 2019-11-11 LAB
LAB AP CASE REPORT: NORMAL
PATH REPORT.FINAL DX SPEC: NORMAL

## 2022-07-30 ENCOUNTER — TELEPHONE ENCOUNTER (OUTPATIENT)
Age: 77
End: 2022-07-30

## 2022-07-31 ENCOUNTER — TELEPHONE ENCOUNTER (OUTPATIENT)
Age: 77
End: 2022-07-31

## (undated) DEVICE — Device

## (undated) DEVICE — JELLY,LUBE,STERILE,FLIP TOP,TUBE,2-OZ: Brand: MEDLINE

## (undated) DEVICE — ENDOGATOR AUXILIARY WATER JET CONNECTOR: Brand: ENDOGATOR

## (undated) DEVICE — PAD GRND REM POLYHESIVE A/ DISP

## (undated) DEVICE — ENDOSCOPY PORT CONNECTOR FOR OLYMPUS® SCOPES: Brand: ERBE

## (undated) DEVICE — LUBE JELLY PK/2.75GM STRL BX/144

## (undated) DEVICE — FRCP BIOP COLD ENDOJAW ALLGTR W/NDL 2.8X2300MM BLU

## (undated) DEVICE — HYBRID TUBING/CAP SET FOR OLYMPUS® SCOPES: Brand: ERBE

## (undated) DEVICE — SNAR POLYP SENSATION STDOVL 27 240 BX40

## (undated) DEVICE — TRAP,MUCUS SPECIMEN,40CC: Brand: MEDLINE

## (undated) DEVICE — Device: Brand: DEFENDO AIR/WATER/SUCTION AND BIOPSY VALVE